# Patient Record
Sex: FEMALE | Race: BLACK OR AFRICAN AMERICAN | NOT HISPANIC OR LATINO | Employment: FULL TIME | URBAN - METROPOLITAN AREA
[De-identification: names, ages, dates, MRNs, and addresses within clinical notes are randomized per-mention and may not be internally consistent; named-entity substitution may affect disease eponyms.]

---

## 2024-08-24 ENCOUNTER — HOSPITAL ENCOUNTER (EMERGENCY)
Facility: HOSPITAL | Age: 39
Discharge: HOME | End: 2024-08-24
Payer: COMMERCIAL

## 2024-08-24 VITALS
BODY MASS INDEX: 24.75 KG/M2 | HEART RATE: 67 BPM | WEIGHT: 145 LBS | RESPIRATION RATE: 13 BRPM | OXYGEN SATURATION: 94 % | DIASTOLIC BLOOD PRESSURE: 69 MMHG | SYSTOLIC BLOOD PRESSURE: 103 MMHG | TEMPERATURE: 98.4 F | HEIGHT: 64 IN

## 2024-08-24 DIAGNOSIS — T78.40XA ALLERGIC REACTION, INITIAL ENCOUNTER: Primary | ICD-10-CM

## 2024-08-24 DIAGNOSIS — T63.441A BEE STING REACTION, ACCIDENTAL OR UNINTENTIONAL, INITIAL ENCOUNTER: ICD-10-CM

## 2024-08-24 PROCEDURE — 99284 EMERGENCY DEPT VISIT MOD MDM: CPT | Mod: 25

## 2024-08-24 PROCEDURE — 2500000004 HC RX 250 GENERAL PHARMACY W/ HCPCS (ALT 636 FOR OP/ED): Performed by: CLINICAL NURSE SPECIALIST

## 2024-08-24 PROCEDURE — 96375 TX/PRO/DX INJ NEW DRUG ADDON: CPT

## 2024-08-24 PROCEDURE — 96374 THER/PROPH/DIAG INJ IV PUSH: CPT

## 2024-08-24 PROCEDURE — 99291 CRITICAL CARE FIRST HOUR: CPT | Performed by: CLINICAL NURSE SPECIALIST

## 2024-08-24 PROCEDURE — 2500000001 HC RX 250 WO HCPCS SELF ADMINISTERED DRUGS (ALT 637 FOR MEDICARE OP)

## 2024-08-24 RX ORDER — LORATADINE 10 MG/1
10 TABLET ORAL DAILY
Qty: 7 TABLET | Refills: 0 | Status: SHIPPED | OUTPATIENT
Start: 2024-08-24 | End: 2024-08-31

## 2024-08-24 RX ORDER — ALUMINUM HYDROXIDE, MAGNESIUM HYDROXIDE, AND SIMETHICONE 1200; 120; 1200 MG/30ML; MG/30ML; MG/30ML
30 SUSPENSION ORAL ONCE
Status: COMPLETED | OUTPATIENT
Start: 2024-08-24 | End: 2024-08-24

## 2024-08-24 RX ORDER — DIPHENHYDRAMINE HYDROCHLORIDE 50 MG/ML
25 INJECTION INTRAMUSCULAR; INTRAVENOUS ONCE
Status: COMPLETED | OUTPATIENT
Start: 2024-08-24 | End: 2024-08-24

## 2024-08-24 RX ORDER — LIDOCAINE HYDROCHLORIDE 20 MG/ML
15 SOLUTION OROPHARYNGEAL ONCE
Status: COMPLETED | OUTPATIENT
Start: 2024-08-24 | End: 2024-08-24

## 2024-08-24 RX ORDER — FAMOTIDINE 10 MG/ML
40 INJECTION INTRAVENOUS ONCE
Status: COMPLETED | OUTPATIENT
Start: 2024-08-24 | End: 2024-08-24

## 2024-08-24 RX ORDER — FAMOTIDINE 20 MG/1
20 TABLET, FILM COATED ORAL 2 TIMES DAILY
Qty: 14 TABLET | Refills: 0 | Status: SHIPPED | OUTPATIENT
Start: 2024-08-24 | End: 2024-08-31

## 2024-08-24 RX ORDER — PREDNISONE 20 MG/1
20 TABLET ORAL DAILY
Qty: 5 TABLET | Refills: 0 | Status: SHIPPED | OUTPATIENT
Start: 2024-08-24 | End: 2024-08-29

## 2024-08-24 RX ADMIN — METHYLPREDNISOLONE SODIUM SUCCINATE 125 MG: 125 INJECTION, POWDER, FOR SOLUTION INTRAMUSCULAR; INTRAVENOUS at 10:33

## 2024-08-24 RX ADMIN — SODIUM CHLORIDE 1000 ML: 900 INJECTION, SOLUTION INTRAVENOUS at 10:33

## 2024-08-24 RX ADMIN — FAMOTIDINE 40 MG: 10 INJECTION, SOLUTION INTRAVENOUS at 10:33

## 2024-08-24 RX ADMIN — DIPHENHYDRAMINE HYDROCHLORIDE 25 MG: 50 INJECTION, SOLUTION INTRAMUSCULAR; INTRAVENOUS at 10:34

## 2024-08-24 RX ADMIN — LIDOCAINE HYDROCHLORIDE 15 ML: 20 SOLUTION ORAL at 11:32

## 2024-08-24 RX ADMIN — ALUMINUM HYDROXIDE, MAGNESIUM HYDROXIDE, AND SIMETHICONE 30 ML: 1200; 120; 1200 SUSPENSION ORAL at 11:33

## 2024-08-24 ASSESSMENT — LIFESTYLE VARIABLES
EVER HAD A DRINK FIRST THING IN THE MORNING TO STEADY YOUR NERVES TO GET RID OF A HANGOVER: NO
HAVE PEOPLE ANNOYED YOU BY CRITICIZING YOUR DRINKING: NO
HAVE YOU EVER FELT YOU SHOULD CUT DOWN ON YOUR DRINKING: NO
EVER FELT BAD OR GUILTY ABOUT YOUR DRINKING: NO
TOTAL SCORE: 0

## 2024-08-24 ASSESSMENT — COLUMBIA-SUICIDE SEVERITY RATING SCALE - C-SSRS
6. HAVE YOU EVER DONE ANYTHING, STARTED TO DO ANYTHING, OR PREPARED TO DO ANYTHING TO END YOUR LIFE?: NO
2. HAVE YOU ACTUALLY HAD ANY THOUGHTS OF KILLING YOURSELF?: NO
1. IN THE PAST MONTH, HAVE YOU WISHED YOU WERE DEAD OR WISHED YOU COULD GO TO SLEEP AND NOT WAKE UP?: NO

## 2024-08-24 ASSESSMENT — PAIN SCALES - GENERAL
PAINLEVEL_OUTOF10: 0 - NO PAIN

## 2024-08-24 ASSESSMENT — PAIN - FUNCTIONAL ASSESSMENT: PAIN_FUNCTIONAL_ASSESSMENT: 0-10

## 2024-08-24 NOTE — ED PROVIDER NOTES
THIS IS MY CARLOS SUPERVISORY AND SHARED VISIT NOTE:    I personally saw the patient and made/approved the management plan and take responsibility for the patient management.    History: Patient is a 39-year-old female who presents with a chief complaint of allergic reaction.  Patient states that she was stung by bee approximately 30 minutes prior to arrival, she started having hives on her legs, arms, chest, trunk and face.  Patient denies any shortness of breath, denies any nausea or vomiting, denies any chest pain.  Patient states she never had an adverse reaction to bees before.  Patient denies any prior history of anaphylaxis.    Exam: GENERAL APPEARANCE: Awake and alert.     HEENT: Normocephalic, atraumatic. Extraocular muscles are intact. Pupils equal round and reactive to light.  CHEST: Nontender to palpation. Clear to auscultation bilaterally. No rales, rhonchi, or wheezing.   HEART: S1, S2. Regular rate and rhythm. No murmurs, gallops or rubs.  Strong and equal pulses in the extremities.   ABDOMEN: Soft,.  non-tender.  No rebound or guarding negative for Curran sign Susana's point tenderness.    NEUROLOGICAL: Awake, alert and oriented x 3.       MDM: Patient seen and evaluated at bedside, patient is in no acute distress.  I will order a Pepcid, normal saline, Solu-Medrol, Benadryl . Differential diagnosis includes but is not limited to allergic reaction, anaphylaxis,  On reassessment, patient is resting comfortably in bed, states she does believe she has minor heartburn, I will trial a dose of the GI cocktail.  Patient will be observed for at least 3 hours, patient be given a prescription for EpiPen autoinjector at discharge.          Please see CARLOS note for further details    Sections of this report were created using voice-to-text technology and may contain errors in translation    Red Silva DO  Emergency Medicine       Red Silva,   08/24/24 1336       Red Silva,   08/24/24 1330

## 2024-08-24 NOTE — ED PROVIDER NOTES
Department of Emergency Medicine   ED  Provider Note  Admit Date/RoomTime: 8/24/2024 10:26 AM  ED Room: [facility]        History of Present Illness:  Chief Complaint   Patient presents with    Allergic Reaction     Pt stung by bee approx 30 min PTA. Pt has hives on legs, arms, chest, trunk, neck, and face. Pt speaking full sentences, lungs CTA, denies any difficulty swallowing.          Magalie Rawls is a 39 y.o. female denies chronic medical illnesses currently on Cipro for UTI to the emergency department for allergic reaction after getting stung by a bee 5 minutes prior to arrival.  Patient denies previous allergy to bee stings.  Denies any tongue swelling lip swelling or difficulty swallowing however reports her lip feels like it is tingling.  She has hives all over her body and her body feels like she is tingling reports she is a burning sensation in her vaginal area.  Presents today with concern she may have an allergic reaction to the bee sting.  Patient reports she was stung on the right hip.  Denies any chest pain shortness of breath or wheezing.  Vital signs reviewed.  Patient seen immediately upon arrival.    Review of Systems:   Pertinent positives and negatives are stated within HPI, all other systems reviewed and are negative.        --------------------------------------------- PAST HISTORY ---------------------------------------------  Past Medical History:  has no past medical history on file.  Past Surgical History:  has no past surgical history on file.  Social History:    Family History: family history is not on file.. Unless otherwise noted, family history is non contributory  The patient’s home medications have been reviewed.  Allergies: Patient has no known allergies.        ---------------------------------------------------PHYSICAL EXAM--------------------------------------    GENERAL APPEARANCE: Awake and alert.   VITAL SIGNS: As per the nurses' triage record.   HEENT: Normocephalic,  "atraumatic. Extraocular muscles are intact. Pupils equal round and reactive to light. Conjunctiva are pink. Negative scleral icterus. Mucous membranes are moist. Tongue in the midline. Pharynx was without erythema or exudates, uvula midline no muffled voice.  Airway is patent.  Uvula midline palate is symmetrical  NECK: Soft Nontender and supple, full gross ROM, no meningeal signs.  CHEST: Nontender to palpation. Clear to auscultation bilaterally. No rales, rhonchi, or wheezing.   HEART: S1, S2. Regular rate and rhythm. No murmurs, gallops or rubs.  Strong and equal pulses in the extremities.   ABDOMEN: Soft, nontender, nondistended, positive bowel sounds, no palpable masses.  MUSCULCSKELETAL:Full gross active range of motion. Ambulating on own with no acute difficulties bee sting to the right lateral hip area.  Surrounding erythema hives.  NEUROLOGICAL: Awake, alert and oriented x 3. Power intact in the upper and lower extremities. Sensation is intact to light touch in the upper and lower extremities.   IMMUNOLOGICAL: No lymphatic streaking noted   DERM: No petechiae,  or ecchymoses.  Scattered hives to bilateral legs arms face abdomen.  No pustules purpura petechiae noted.           ------------------------- NURSING NOTES AND VITALS REVIEWED ---------------------------  The nursing notes within the ED encounter and vital signs as below have been reviewed by myself  /69   Pulse 67   Temp 36.9 °C (98.4 °F) (Oral)   Resp 13   Ht 1.626 m (5' 4\")   Wt 65.8 kg (145 lb)   SpO2 94%   BMI 24.89 kg/m²     Oxygen Saturation Interpretation: 97% room air    The cardiac monitor revealed sinus rhythm with a heart rate in the 80s as interpreted by me. The cardiac monitor was ordered secondary to the patient's heart rate and to monitor the patient for dysrhythmia.       The patient’s available past medical records and past encounters were reviewed.          -----------------------DIAGNOSTIC " RESULTS------------------------  LABS:    Labs Reviewed - No data to display    As interpreted by me, the above displayed labs are abnormal. All other labs obtained during this visit were within normal range or not returned as of this dictation.        ------------------------------ ED COURSE/MEDICAL DECISION MAKING----------------------  Medical Decision Making:   Exam: A medically appropriate exam performed, outlined above, given the known history and presentation.    History obtained from: Review of medical record nursing notes patient      Social Determinants of Health considered during this visit: Was stung by a bee prior to arrival at a football game      PAST MEDICAL HISTORY/Chronic Conditions Affecting Care     has no past medical history on file.       CC/HPI Summary, Social Determinants of health, Records Reviewed, DDx, testing done/not done, ED Course, Reassessment, disposition considerations/shared decision making with patient, consults, disposition:   Presents with concerns of allergic reaction after being stung by a bee in the right hip  Plan  Telemetry  Benadryl  Pepcid  Solu-Medrol  Normal saline    Medical Decision Making/Differential Diagnosis:  Differentials include but not limited to localized allergic reaction versus allergic reaction versus anaphylaxis versus anaphylactic shock.  No signs of angioedema.  Lungs are clear on exam no signs of respiratory distress.  Vital signs are stable.  Muffled voice noted.  Airway is patent.  Patient seen immediately upon arrival IV medications ordered Solu-Medrol Pepcid Benadryl and normal saline patient placed on telemetry.  Will monitor in the emergency department.  Patient monitored for several hours in the emergency department no signs of anaphylaxis or anaphylactic shock.  Based on her clinical presentation history and symptoms consistent with allergic reaction to bee sting.  Patient improvement of symptoms with Benadryl Pepcid and Solu-Medrol normal  saline.  No signs of respiratory distress.  Hives have improved.  Lung sounds are clear.  Patient was given EpiPen advised on instructions of using the EpiPen.  Steroids risk and benefits reviewed with her amenable to plan continue with Pepcid and Claritin.  Monitor worsening signs and symptoms of allergic reaction or infection close follow-up with primary care physician.  Her primary care physician called in to check on the patient.  Patient was notified.  She is to follow-up in 2 days for reevaluation return with any worsening symptoms or concerns patient amenable plan amenable to discharge.  Patient states she is feeling 100% better.  Based on her clinical presentation history and symptoms consistent with allergic reaction to bee sting.  Patient seen and evaluated with attending physician Dr. Shira COLLINS for discharge utilized for discharge planning    PROCEDURES  Unless otherwise noted below, none      CONSULTS:   None      ED Course as of 08/24/24 1554   Sat Aug 24, 2024   1123 Patient reevaluated no acute distress.  Hives have improved.  Complains of heartburn heart rate has improved.  Patient reports history of acid reflux Mylanta ordered.  Will continue to monitor [TB]   1341 Reassessed reports she feels much better.  She is having no tongue swelling lip swelling difficulty swallowing.  Lung sounds clear.  Hives have resolved.  Complains of some tightness in her right hand but reports it is improving.  Patient feels amenable to going home at this time.  Family at bedside advised on signs and symptoms to monitor for at home [TB]      ED Course User Index  [TB] ARUN Brannon         Diagnoses as of 08/24/24 1554   Allergic reaction, initial encounter   Bee sting reaction, accidental or unintentional, initial encounter         This patient has remained hemodynamically stable during their ED course.      Critical Care: 31  Critical Care    Performed by: ARUN Brannon  Authorized by: Red ABRAMS  DO Shira    Critical care provider statement:     Critical care time (minutes):  31    Critical care time was exclusive of:  Separately billable procedures and treating other patients and teaching time    Critical care was necessary to treat or prevent imminent or life-threatening deterioration of the following conditions:  Toxidrome    Critical care was time spent personally by me on the following activities:  Development of treatment plan with patient or surrogate, evaluation of patient's response to treatment, examination of patient, obtaining history from patient or surrogate, ordering and performing treatments and interventions, pulse oximetry, re-evaluation of patient's condition and review of old charts      Critical care time secondary to allergic reaction requiring IV medication intervention and close monitoring with telemetry.  Frequent reassessment    Counseling:  The emergency provider has spoken with the patient family and discussed today’s results, in addition to providing specific details for the plan of care and counseling regarding the diagnosis and prognosis.  Questions are answered at this time and they are agreeable with the plan.         --------------------------------- IMPRESSION AND DISPOSITION ---------------------------------    IMPRESSION  1. Allergic reaction, initial encounter    2. Bee sting reaction, accidental or unintentional, initial encounter        DISPOSITION  Disposition: Discharge home  Patient condition is stable improved        NOTE: This report was transcribed using voice recognition software. Every effort was made to ensure accuracy; however, inadvertent computerized transcription errors may be present      JACKELYN Brannon-JOANNA  08/24/24 1552

## 2024-08-24 NOTE — Clinical Note
Magalei Rawls was seen and treated in our emergency department on 8/24/2024.  She may return to work on 08/26/2024.  1-3 days if needed      If you have any questions or concerns, please don't hesitate to call.      Red Silva, DO

## 2024-08-24 NOTE — DISCHARGE INSTRUCTIONS
Monitor for worsening signs and symptoms of allergic reaction  Take steroids with food risk and benefits reviewed with you.  Amenable to plan first dose given in the emergency department start tomorrow  Increase fluids  Cool compresses avoid hot baths  Aveeno oatmeal bath to help sooth the skin  Triple Antibiotic ointment to the bee sting site to help with an infection  Monitor for signs and symptoms of infection